# Patient Record
Sex: FEMALE | Race: WHITE | Employment: FULL TIME
[De-identification: names, ages, dates, MRNs, and addresses within clinical notes are randomized per-mention and may not be internally consistent; named-entity substitution may affect disease eponyms.]

---

## 2017-05-29 ENCOUNTER — TELEPHONE (OUTPATIENT)
Dept: FAMILY MEDICINE CLINIC | Facility: CLINIC | Age: 47
End: 2017-05-29

## 2017-05-29 DIAGNOSIS — F32.0 MAJOR DEPRESSIVE DISORDER, SINGLE EPISODE, MILD (HCC): Primary | Chronic | ICD-10-CM

## 2017-05-29 DIAGNOSIS — E78.2 MIXED HYPERLIPIDEMIA: ICD-10-CM

## 2017-05-30 PROBLEM — E78.2 MIXED HYPERLIPIDEMIA: Status: ACTIVE | Noted: 2017-05-30

## 2017-05-30 RX ORDER — FLUOXETINE HYDROCHLORIDE 40 MG/1
40 CAPSULE ORAL DAILY
Qty: 30 CAPSULE | Refills: 1 | Status: SHIPPED | OUTPATIENT
Start: 2017-05-30 | End: 2017-08-15 | Stop reason: SDUPTHER

## 2017-06-01 ENCOUNTER — TELEPHONE (OUTPATIENT)
Dept: FAMILY MEDICINE CLINIC | Age: 47
End: 2017-06-01

## 2017-06-30 ENCOUNTER — OFFICE VISIT (OUTPATIENT)
Dept: FAMILY MEDICINE CLINIC | Age: 47
End: 2017-06-30
Payer: COMMERCIAL

## 2017-06-30 VITALS
SYSTOLIC BLOOD PRESSURE: 118 MMHG | HEART RATE: 92 BPM | RESPIRATION RATE: 18 BRPM | WEIGHT: 208 LBS | BODY MASS INDEX: 42.01 KG/M2 | DIASTOLIC BLOOD PRESSURE: 82 MMHG | TEMPERATURE: 99.1 F

## 2017-06-30 DIAGNOSIS — E78.2 MIXED HYPERLIPIDEMIA: ICD-10-CM

## 2017-06-30 DIAGNOSIS — F32.0 MAJOR DEPRESSIVE DISORDER, SINGLE EPISODE, MILD (HCC): Primary | Chronic | ICD-10-CM

## 2017-06-30 DIAGNOSIS — Z80.3 FAMILY HISTORY OF BREAST CANCER IN MOTHER: ICD-10-CM

## 2017-06-30 DIAGNOSIS — Z12.39 SCREENING FOR BREAST CANCER: ICD-10-CM

## 2017-06-30 PROCEDURE — 99214 OFFICE O/P EST MOD 30 MIN: CPT | Performed by: NURSE PRACTITIONER

## 2017-06-30 ASSESSMENT — ENCOUNTER SYMPTOMS
SORE THROAT: 0
VOMITING: 0
COUGH: 0
ABDOMINAL PAIN: 0
BLOOD IN STOOL: 0
EYE DISCHARGE: 0
DIARRHEA: 0
SINUS PRESSURE: 0
BACK PAIN: 0
CHOKING: 0
WHEEZING: 0
NAUSEA: 0
EYE REDNESS: 0
SHORTNESS OF BREATH: 0
RHINORRHEA: 0
CONSTIPATION: 0

## 2017-06-30 ASSESSMENT — PATIENT HEALTH QUESTIONNAIRE - PHQ9
SUM OF ALL RESPONSES TO PHQ9 QUESTIONS 1 & 2: 0
SUM OF ALL RESPONSES TO PHQ QUESTIONS 1-9: 0
2. FEELING DOWN, DEPRESSED OR HOPELESS: 0
1. LITTLE INTEREST OR PLEASURE IN DOING THINGS: 0

## 2017-08-15 DIAGNOSIS — F32.0 MAJOR DEPRESSIVE DISORDER, SINGLE EPISODE, MILD (HCC): Chronic | ICD-10-CM

## 2017-08-15 RX ORDER — FLUOXETINE HYDROCHLORIDE 40 MG/1
40 CAPSULE ORAL DAILY
Qty: 30 CAPSULE | Refills: 1 | Status: SHIPPED | OUTPATIENT
Start: 2017-08-15 | End: 2017-11-20 | Stop reason: SDUPTHER

## 2017-11-13 LAB
ALBUMIN SERPL-MCNC: 4 G/DL
ALP BLD-CCNC: 88 U/L
ALT SERPL-CCNC: 18 U/L
ANION GAP SERPL CALCULATED.3IONS-SCNC: 12 MMOL/L
AST SERPL-CCNC: 18 U/L
BILIRUB SERPL-MCNC: 0.4 MG/DL (ref 0.1–1.4)
BUN BLDV-MCNC: 8 MG/DL
CALCIUM SERPL-MCNC: 9.5 MG/DL
CHLORIDE BLD-SCNC: 100 MMOL/L
CHOLESTEROL, TOTAL: 217 MG/DL
CHOLESTEROL/HDL RATIO: 4.8
CO2: 25 MMOL/L
CREAT SERPL-MCNC: 0.68 MG/DL
GFR CALCULATED: >60
GLUCOSE BLD-MCNC: 66 MG/DL
HDLC SERPL-MCNC: 45 MG/DL (ref 35–70)
LDL CHOLESTEROL CALCULATED: 133 MG/DL (ref 0–160)
POTASSIUM SERPL-SCNC: 4.4 MMOL/L
SODIUM BLD-SCNC: 137 MMOL/L
TOTAL PROTEIN: 7.4
TRIGL SERPL-MCNC: 194 MG/DL
VITAMIN D 25-HYDROXY: 20.9
VITAMIN D2, 25 HYDROXY: NORMAL
VITAMIN D3,25 HYDROXY: NORMAL
VLDLC SERPL CALC-MCNC: 39 MG/DL

## 2017-11-14 DIAGNOSIS — E78.2 MIXED HYPERLIPIDEMIA: ICD-10-CM

## 2017-11-14 DIAGNOSIS — E78.1 HYPERTRIGLYCERIDEMIA: ICD-10-CM

## 2017-11-15 RX ORDER — ACETAMINOPHEN 160 MG
1 TABLET,DISINTEGRATING ORAL DAILY
Qty: 30 CAPSULE | COMMUNITY
Start: 2017-11-15 | End: 2020-03-19

## 2017-11-20 DIAGNOSIS — F32.0 MAJOR DEPRESSIVE DISORDER, SINGLE EPISODE, MILD (HCC): Chronic | ICD-10-CM

## 2017-11-20 RX ORDER — FLUOXETINE HYDROCHLORIDE 40 MG/1
40 CAPSULE ORAL DAILY
Qty: 30 CAPSULE | Refills: 3 | Status: SHIPPED | OUTPATIENT
Start: 2017-11-20 | End: 2018-04-12 | Stop reason: SDUPTHER

## 2018-01-18 ENCOUNTER — OFFICE VISIT (OUTPATIENT)
Dept: FAMILY MEDICINE CLINIC | Age: 48
End: 2018-01-18
Payer: COMMERCIAL

## 2018-01-18 VITALS
HEIGHT: 60 IN | WEIGHT: 208 LBS | DIASTOLIC BLOOD PRESSURE: 80 MMHG | HEART RATE: 87 BPM | BODY MASS INDEX: 40.84 KG/M2 | TEMPERATURE: 96.8 F | RESPIRATION RATE: 20 BRPM | SYSTOLIC BLOOD PRESSURE: 117 MMHG

## 2018-01-18 DIAGNOSIS — J20.9 BRONCHITIS WITH BRONCHOSPASM: Primary | ICD-10-CM

## 2018-01-18 DIAGNOSIS — J01.40 ACUTE NON-RECURRENT PANSINUSITIS: ICD-10-CM

## 2018-01-18 PROCEDURE — 99213 OFFICE O/P EST LOW 20 MIN: CPT | Performed by: NURSE PRACTITIONER

## 2018-01-18 RX ORDER — ALBUTEROL SULFATE 90 UG/1
2 AEROSOL, METERED RESPIRATORY (INHALATION) EVERY 6 HOURS PRN
Qty: 1 INHALER | Refills: 0 | Status: SHIPPED | OUTPATIENT
Start: 2018-01-18

## 2018-01-18 RX ORDER — METHYLPREDNISOLONE 4 MG/1
TABLET ORAL
Qty: 1 KIT | Refills: 0 | Status: SHIPPED | OUTPATIENT
Start: 2018-01-18 | End: 2018-01-24

## 2018-01-18 RX ORDER — FLUTICASONE PROPIONATE 50 MCG
1 SPRAY, SUSPENSION (ML) NASAL DAILY
Qty: 1 BOTTLE | Refills: 0 | COMMUNITY
Start: 2018-01-18

## 2018-01-18 ASSESSMENT — ENCOUNTER SYMPTOMS
ABDOMINAL PAIN: 0
CHEST TIGHTNESS: 0
COUGH: 1
DIARRHEA: 0
NAUSEA: 0
RHINORRHEA: 1
CONSTIPATION: 0
SHORTNESS OF BREATH: 0
EYE DISCHARGE: 1
VOICE CHANGE: 1
WHEEZING: 1
SORE THROAT: 0
VOMITING: 1

## 2018-01-18 NOTE — PATIENT INSTRUCTIONS
good.  When should you call for help? Call 911 anytime you think you may need emergency care. For example, call if:  ? · You have severe trouble breathing. ?Call your doctor now or seek immediate medical care if:  ? · You have new or worse trouble breathing. ? · You cough up dark brown or bloody mucus (sputum). ? · You have a new or higher fever. ? · You have a new rash. ? Watch closely for changes in your health, and be sure to contact your doctor if:  ? · You cough more deeply or more often, especially if you notice more mucus or a change in the color of your mucus. ? · You are not getting better as expected. Where can you learn more? Go to https://Green Throttle Games.Game Nation. org and sign in to your [employer] account. Enter H333 in the [employer] box to learn more about \"Bronchitis: Care Instructions. \"     If you do not have an account, please click on the \"Sign Up Now\" link. Current as of: May 12, 2017  Content Version: 11.5  © 6162-1048 [employer]. Care instructions adapted under license by [facility] ([facility]). If you have questions about a medical condition or this instruction, always ask your healthcare professional. [place] [address] any warranty or liability for your use of this information. Patient Education        Sinusitis: Care Instructions  Your Care Instructions    Sinusitis is an infection of the lining of the sinus cavities in your head. Sinusitis often follows a cold. It causes pain and pressure in your head and face. In most cases, sinusitis gets better on its own in 1 to 2 weeks. But some mild symptoms may last for several weeks. Sometimes antibiotics are needed. Follow-up care is a key part of your treatment and safety. Be sure to make and go to all appointments, and call your doctor if you are having problems. It's also a good idea to know your test results and keep a list of the medicines you take.   How can you care for yourself at salt water into the syringe by relaxing your hand. · Sit down with your head tilted slightly back. Do not lie down. Put the tip of the bulb syringe or the squeeze bottle a little way into one of your nostrils. Gently drip or squirt a few drops into the nostril. Repeat with the other nostril. Some sneezing and gagging are normal at first.  · Gently blow your nose. · Wipe the syringe or bottle tip clean after each use. · Repeat this 2 or 3 times a day. · Use nasal washes gently if you have nosebleeds often. When should you call for help? Watch closely for changes in your health, and be sure to contact your doctor if:  ? · You often get nosebleeds. ? · You have problems doing the nasal washes. Where can you learn more? Go to https://chpelynneweb.Samfind. org and sign in to your Pop.it account. Enter [id number] [id number] [phone] in the [employer] box to learn more about \"Saline Nasal Washes: Care Instructions. \"     If you do not have an account, please click on the \"Sign Up Now\" link. Current as of: May 12, 2017  Content Version: 11.5  © 8397-2572 Healthwise, Incorporated. Care instructions adapted under license by [facility] ([facility]). If you have questions about a medical condition or this instruction, always ask your healthcare professional. [address]  any warranty or liability for your use of this information.

## 2018-01-18 NOTE — LETTER
[address] [place] [address]  Phone: [phone]  Fax: [address] [place]        January 18, 2018     Patient: Burgess Blakely   YOB: 1970   Date of Visit: 1/18/2018       To Whom it May Concern:    Burgess Blakely was seen in my office on 1/18/2018. She may return to work on 1/19/18. Please excuse her from work on 1/16/18-1/18/18. If you have any questions or concerns, please don't hesitate to call.     Sincerely,         Alissa Mariano, CNP

## 2018-01-18 NOTE — PROGRESS NOTES
Subjective:      Patient ID: Salina Nguyen is a 52 y.o. female. Visit Information    Have you changed or started any medications since your last visit including any over-the-counter medicines, vitamins, or herbal medicines? no   Have you stopped taking any of your medications? Is so, why? -  no  Are you having any side effects from any of your medications? - no    Have you seen any other physician or provider since your last visit?  no   Have you had any other diagnostic tests since your last visit?  no   Have you been seen in the emergency room and/or had an admission in a hospital since we last saw you?  no   Have you had your routine dental cleaning in the past 6 months?  no     Do you have an active MyChart account? If no, what is the barrier? Yes    Patient Care Team:  Valentino Mayers, MD as PCP - General (Pediatrics)    Medical History Review  Past Medical, Family, and Social History reviewed and does not contribute to the patient presenting condition    Health Maintenance   Topic Date Due    HIV screen  06/18/1985    Breast cancer screen  03/19/2014    Flu vaccine (1) 11/01/2018 (Originally 9/1/2017)    Cervical cancer screen  04/13/2018    Lipid screen  11/13/2022    DTaP/Tdap/Td vaccine (2 - Td) 04/10/2024       Cough / congestion for last 4 days. Getting worse. No fever. Has been hot and cold on and off. Cough   This is a new problem. The current episode started in the past 7 days. The problem has been gradually worsening. The problem occurs every few hours. The cough is productive of sputum (pale yellow). Associated symptoms include chest pain (with coughing), chills, ear congestion, headaches, nasal congestion, postnasal drip, rhinorrhea and wheezing. Pertinent negatives include no ear pain, fever, sore throat or shortness of breath. Nothing aggravates the symptoms. She has tried rest and body position changes (nyquil, tylenol sinus ) for the symptoms. The treatment provided mild relief. Her past medical history is significant for bronchitis, environmental allergies and pneumonia. There is no history of asthma. Review of Systems   Constitutional: Positive for appetite change, chills and fatigue. Negative for fever. HENT: Positive for congestion, postnasal drip, rhinorrhea and voice change. Negative for ear pain and sore throat. Eyes: Positive for discharge (watering). Respiratory: Positive for cough and wheezing. Negative for chest tightness and shortness of breath. Cardiovascular: Positive for chest pain (with coughing). Gastrointestinal: Positive for vomiting (couple of days ago - drainage). Negative for abdominal pain, constipation, diarrhea and nausea. Genitourinary: Negative. Allergic/Immunologic: Positive for environmental allergies. Neurological: Positive for headaches. Negative for dizziness and syncope. Psychiatric/Behavioral: Positive for sleep disturbance. Objective:   Physical Exam   Constitutional: She is oriented to person, place, and time. She appears well-developed. No distress. HENT:   Head: Normocephalic and atraumatic. Right Ear: Hearing, tympanic membrane, external ear and ear canal normal.   Left Ear: Hearing, tympanic membrane, external ear and ear canal normal.   Nose: Mucosal edema, rhinorrhea and sinus tenderness present. Right sinus exhibits maxillary sinus tenderness. Right sinus exhibits no frontal sinus tenderness. Left sinus exhibits maxillary sinus tenderness. Left sinus exhibits no frontal sinus tenderness. Mouth/Throat: Uvula is midline and mucous membranes are normal. Posterior oropharyngeal erythema (with PND) present. No oropharyngeal exudate. Eyes: Conjunctivae are normal. Pupils are equal, round, and reactive to light. Right eye exhibits no discharge. Left eye exhibits no discharge. Neck: Neck supple. Cardiovascular: Normal rate, regular rhythm and normal heart sounds.     Pulmonary/Chest: Effort normal. No respiratory distress. She has wheezes (expiratory t/o). She has rhonchi in the right upper field and the left upper field. Harsh almost constant cough in office    Abdominal: Soft. Bowel sounds are normal. She exhibits no distension. There is no tenderness. Lymphadenopathy:     She has no cervical adenopathy. Neurological: She is alert and oriented to person, place, and time. Skin: Skin is warm and dry. Psychiatric: She has a normal mood and affect. Her behavior is normal.   Nursing note and vitals reviewed. Assessment:      1. Bronchitis with bronchospasm  methylPREDNISolone (MEDROL, EDA,) 4 MG tablet    albuterol sulfate HFA (PROAIR HFA) 108 (90 Base) MCG/ACT inhaler   2. Acute non-recurrent pansinusitis  fluticasone (FLONASE) 50 MCG/ACT nasal spray         Plan:       Start flonase daily  Proceed with a start medrol dose eda   Recommend using albuterol inhaler as needed for wheezing  Recommend OTC Tylenol/Motrin for discomfort   Advise to use salt water gargles for sore throat  Advise to take hot water steam to help with congestion  Use saline rinse to nose as needed for congestion. Recommend use humidifier at night. May use hot tea with honey and lemon for sore throat/cough. Keep well hydrated and get well rested  Call if any concerns  Return if symptoms worsen or fail to improve. Heidy L received counseling on the following healthy behaviors: nutrition and medication adherence  Reviewed prior labs and health maintenance. Continue current medications, diet and exercise. Discussed use, benefit, and side effects of prescribed medications. Barriers to medication compliance addressed. Patient given educational materials - see patient instructions. All patient questions answered. Patient voiced understanding.

## 2018-05-14 ENCOUNTER — OFFICE VISIT (OUTPATIENT)
Dept: FAMILY MEDICINE CLINIC | Age: 48
End: 2018-05-14
Payer: COMMERCIAL

## 2018-05-14 VITALS
BODY MASS INDEX: 41.21 KG/M2 | RESPIRATION RATE: 18 BRPM | TEMPERATURE: 98.3 F | DIASTOLIC BLOOD PRESSURE: 82 MMHG | SYSTOLIC BLOOD PRESSURE: 128 MMHG | WEIGHT: 211 LBS | HEART RATE: 100 BPM

## 2018-05-14 DIAGNOSIS — B37.2 YEAST DERMATITIS: ICD-10-CM

## 2018-05-14 DIAGNOSIS — Z12.31 ENCOUNTER FOR SCREENING MAMMOGRAM FOR BREAST CANCER: ICD-10-CM

## 2018-05-14 DIAGNOSIS — Z01.419 WELL WOMAN EXAM WITH ROUTINE GYNECOLOGICAL EXAM: Primary | ICD-10-CM

## 2018-05-14 DIAGNOSIS — E78.2 MIXED HYPERLIPIDEMIA: ICD-10-CM

## 2018-05-14 DIAGNOSIS — Z00.00 PHYSICAL EXAM, ANNUAL: ICD-10-CM

## 2018-05-14 DIAGNOSIS — F32.0 MAJOR DEPRESSIVE DISORDER, SINGLE EPISODE, MILD (HCC): Chronic | ICD-10-CM

## 2018-05-14 PROCEDURE — 99396 PREV VISIT EST AGE 40-64: CPT | Performed by: NURSE PRACTITIONER

## 2018-05-14 RX ORDER — NYSTATIN 100000 [USP'U]/G
POWDER TOPICAL
Qty: 1 BOTTLE | Refills: 2 | Status: SHIPPED | OUTPATIENT
Start: 2018-05-14 | End: 2019-05-14

## 2018-05-14 RX ORDER — FLUOXETINE HYDROCHLORIDE 40 MG/1
40 CAPSULE ORAL DAILY
Qty: 30 CAPSULE | Refills: 5 | Status: SHIPPED | OUTPATIENT
Start: 2018-05-14 | End: 2018-12-27 | Stop reason: SDUPTHER

## 2018-05-14 ASSESSMENT — ENCOUNTER SYMPTOMS
SHORTNESS OF BREATH: 0
VOMITING: 0
RHINORRHEA: 0
WHEEZING: 0
ABDOMINAL PAIN: 1
EYE REDNESS: 0
EYE DISCHARGE: 0
BLOOD IN STOOL: 0
SINUS PRESSURE: 0
SORE THROAT: 0
COUGH: 0
DIARRHEA: 0
CHOKING: 0
CONSTIPATION: 0
NAUSEA: 0
BACK PAIN: 0

## 2018-05-21 DIAGNOSIS — Z01.419 WELL WOMAN EXAM WITH ROUTINE GYNECOLOGICAL EXAM: ICD-10-CM

## 2018-10-22 ENCOUNTER — APPOINTMENT (OUTPATIENT)
Dept: CT IMAGING | Age: 48
DRG: 069 | End: 2018-10-22
Payer: COMMERCIAL

## 2018-10-22 ENCOUNTER — HOSPITAL ENCOUNTER (INPATIENT)
Age: 48
LOS: 1 days | Discharge: HOME | DRG: 069 | End: 2018-10-23
Attending: EMERGENCY MEDICINE | Admitting: PSYCHIATRY & NEUROLOGY
Payer: COMMERCIAL

## 2018-10-22 ENCOUNTER — APPOINTMENT (OUTPATIENT)
Dept: MRI IMAGING | Age: 48
DRG: 069 | End: 2018-10-22
Payer: COMMERCIAL

## 2018-10-22 DIAGNOSIS — I63.9 CEREBROVASCULAR ACCIDENT (CVA), UNSPECIFIED MECHANISM (HCC): Primary | ICD-10-CM

## 2018-10-22 PROBLEM — G45.9 TIA (TRANSIENT ISCHEMIC ATTACK): Status: ACTIVE | Noted: 2018-10-22

## 2018-10-22 LAB — HCG QUALITATIVE: NEGATIVE

## 2018-10-22 PROCEDURE — 6360000004 HC RX CONTRAST MEDICATION: Performed by: EMERGENCY MEDICINE

## 2018-10-22 PROCEDURE — 70496 CT ANGIOGRAPHY HEAD: CPT

## 2018-10-22 PROCEDURE — 99285 EMERGENCY DEPT VISIT HI MDM: CPT

## 2018-10-22 PROCEDURE — 99254 IP/OBS CNSLTJ NEW/EST MOD 60: CPT | Performed by: PSYCHIATRY & NEUROLOGY

## 2018-10-22 PROCEDURE — 70551 MRI BRAIN STEM W/O DYE: CPT

## 2018-10-22 PROCEDURE — 70498 CT ANGIOGRAPHY NECK: CPT

## 2018-10-22 PROCEDURE — 84703 CHORIONIC GONADOTROPIN ASSAY: CPT

## 2018-10-22 PROCEDURE — 2060000000 HC ICU INTERMEDIATE R&B

## 2018-10-22 RX ADMIN — IOPAMIDOL 90 ML: 755 INJECTION, SOLUTION INTRAVENOUS at 22:54

## 2018-10-23 VITALS
BODY MASS INDEX: 40.32 KG/M2 | WEIGHT: 200 LBS | TEMPERATURE: 97.1 F | HEIGHT: 59 IN | DIASTOLIC BLOOD PRESSURE: 75 MMHG | HEART RATE: 85 BPM | OXYGEN SATURATION: 95 % | RESPIRATION RATE: 17 BRPM | SYSTOLIC BLOOD PRESSURE: 145 MMHG

## 2018-10-23 LAB
ANION GAP SERPL CALCULATED.3IONS-SCNC: 11 MMOL/L (ref 9–17)
BUN BLDV-MCNC: 8 MG/DL (ref 6–20)
BUN/CREAT BLD: NORMAL (ref 9–20)
CALCIUM SERPL-MCNC: 9.1 MG/DL (ref 8.6–10.4)
CHLORIDE BLD-SCNC: 100 MMOL/L (ref 98–107)
CHOLESTEROL/HDL RATIO: 5.1
CHOLESTEROL: 198 MG/DL
CO2: 28 MMOL/L (ref 20–31)
CREAT SERPL-MCNC: 0.62 MG/DL (ref 0.5–0.9)
ESTIMATED AVERAGE GLUCOSE: 117 MG/DL
GFR AFRICAN AMERICAN: >60 ML/MIN
GFR NON-AFRICAN AMERICAN: >60 ML/MIN
GFR SERPL CREATININE-BSD FRML MDRD: NORMAL ML/MIN/{1.73_M2}
GFR SERPL CREATININE-BSD FRML MDRD: NORMAL ML/MIN/{1.73_M2}
GLUCOSE BLD-MCNC: 76 MG/DL (ref 70–99)
HBA1C MFR BLD: 5.7 % (ref 4–6)
HCT VFR BLD CALC: 37.7 % (ref 36.3–47.1)
HDLC SERPL-MCNC: 39 MG/DL
HEMOGLOBIN: 11.9 G/DL (ref 11.9–15.1)
LDL CHOLESTEROL: 95 MG/DL (ref 0–130)
LV EF: 60 %
LVEF MODALITY: NORMAL
MCH RBC QN AUTO: 26.9 PG (ref 25.2–33.5)
MCHC RBC AUTO-ENTMCNC: 31.6 G/DL (ref 28.4–34.8)
MCV RBC AUTO: 85.1 FL (ref 82.6–102.9)
NRBC AUTOMATED: 0 PER 100 WBC
PDW BLD-RTO: 15.9 % (ref 11.8–14.4)
PLATELET # BLD: 320 K/UL (ref 138–453)
PMV BLD AUTO: 9.4 FL (ref 8.1–13.5)
POTASSIUM SERPL-SCNC: 4.1 MMOL/L (ref 3.7–5.3)
RBC # BLD: 4.43 M/UL (ref 3.95–5.11)
SODIUM BLD-SCNC: 139 MMOL/L (ref 135–144)
TRIGL SERPL-MCNC: 320 MG/DL
VLDLC SERPL CALC-MCNC: ABNORMAL MG/DL (ref 1–30)
WBC # BLD: 10.7 K/UL (ref 3.5–11.3)

## 2018-10-23 PROCEDURE — 6370000000 HC RX 637 (ALT 250 FOR IP): Performed by: STUDENT IN AN ORGANIZED HEALTH CARE EDUCATION/TRAINING PROGRAM

## 2018-10-23 PROCEDURE — 2580000003 HC RX 258: Performed by: STUDENT IN AN ORGANIZED HEALTH CARE EDUCATION/TRAINING PROGRAM

## 2018-10-23 PROCEDURE — 97165 OT EVAL LOW COMPLEX 30 MIN: CPT

## 2018-10-23 PROCEDURE — 85027 COMPLETE CBC AUTOMATED: CPT

## 2018-10-23 PROCEDURE — G8987 SELF CARE CURRENT STATUS: HCPCS

## 2018-10-23 PROCEDURE — 80061 LIPID PANEL: CPT

## 2018-10-23 PROCEDURE — 83036 HEMOGLOBIN GLYCOSYLATED A1C: CPT

## 2018-10-23 PROCEDURE — G8989 SELF CARE D/C STATUS: HCPCS

## 2018-10-23 PROCEDURE — 97535 SELF CARE MNGMENT TRAINING: CPT

## 2018-10-23 PROCEDURE — 99223 1ST HOSP IP/OBS HIGH 75: CPT | Performed by: PSYCHIATRY & NEUROLOGY

## 2018-10-23 PROCEDURE — G8988 SELF CARE GOAL STATUS: HCPCS

## 2018-10-23 PROCEDURE — 80048 BASIC METABOLIC PNL TOTAL CA: CPT

## 2018-10-23 PROCEDURE — 36415 COLL VENOUS BLD VENIPUNCTURE: CPT

## 2018-10-23 PROCEDURE — 93306 TTE W/DOPPLER COMPLETE: CPT

## 2018-10-23 RX ORDER — ASPIRIN 81 MG/1
81 TABLET ORAL DAILY
Status: DISCONTINUED | OUTPATIENT
Start: 2018-10-23 | End: 2018-10-23 | Stop reason: HOSPADM

## 2018-10-23 RX ORDER — SODIUM CHLORIDE 0.9 % (FLUSH) 0.9 %
10 SYRINGE (ML) INJECTION EVERY 12 HOURS SCHEDULED
Status: DISCONTINUED | OUTPATIENT
Start: 2018-10-23 | End: 2018-10-23 | Stop reason: HOSPADM

## 2018-10-23 RX ORDER — ASPIRIN 81 MG/1
81 TABLET ORAL DAILY
Qty: 30 TABLET | Refills: 3 | Status: SHIPPED | OUTPATIENT
Start: 2018-10-24 | End: 2019-03-19 | Stop reason: SDUPTHER

## 2018-10-23 RX ORDER — ATORVASTATIN CALCIUM 40 MG/1
40 TABLET, FILM COATED ORAL NIGHTLY
Status: DISCONTINUED | OUTPATIENT
Start: 2018-10-23 | End: 2018-10-23 | Stop reason: HOSPADM

## 2018-10-23 RX ORDER — ATORVASTATIN CALCIUM 40 MG/1
40 TABLET, FILM COATED ORAL NIGHTLY
Qty: 30 TABLET | Refills: 3 | Status: SHIPPED | OUTPATIENT
Start: 2018-10-23 | End: 2019-03-19 | Stop reason: SDUPTHER

## 2018-10-23 RX ORDER — ACETAMINOPHEN 325 MG/1
650 TABLET ORAL EVERY 4 HOURS PRN
Status: DISCONTINUED | OUTPATIENT
Start: 2018-10-23 | End: 2018-10-23 | Stop reason: HOSPADM

## 2018-10-23 RX ORDER — ATORVASTATIN CALCIUM 40 MG/1
40 TABLET, FILM COATED ORAL NIGHTLY
Qty: 30 TABLET | Refills: 3 | Status: CANCELLED | OUTPATIENT
Start: 2018-10-23

## 2018-10-23 RX ORDER — SODIUM CHLORIDE 0.9 % (FLUSH) 0.9 %
10 SYRINGE (ML) INJECTION PRN
Status: DISCONTINUED | OUTPATIENT
Start: 2018-10-23 | End: 2018-10-23 | Stop reason: HOSPADM

## 2018-10-23 RX ORDER — ONDANSETRON 2 MG/ML
4 INJECTION INTRAMUSCULAR; INTRAVENOUS EVERY 6 HOURS PRN
Status: DISCONTINUED | OUTPATIENT
Start: 2018-10-23 | End: 2018-10-23 | Stop reason: HOSPADM

## 2018-10-23 RX ADMIN — ASPIRIN 81 MG: 81 TABLET ORAL at 08:40

## 2018-10-23 RX ADMIN — ACETAMINOPHEN 650 MG: 325 TABLET ORAL at 13:26

## 2018-10-23 RX ADMIN — SODIUM CHLORIDE, PRESERVATIVE FREE 10 ML: 5 INJECTION INTRAVENOUS at 08:41

## 2018-10-23 ASSESSMENT — PAIN SCALES - GENERAL
PAINLEVEL_OUTOF10: 0
PAINLEVEL_OUTOF10: 0
PAINLEVEL_OUTOF10: 4

## 2018-10-23 NOTE — FLOWSHEET NOTE
[facility]     Emergency/Trauma Note    PATIENT NAME: Minerva Harrington    Shift date: 10/22/18  Shift day: Monday   Shift # 2    Room # 18/18   Name: Minerva Harrington            Age: 50 y.o. Gender: female          Latter day: None   Place of Lutheran: None    Trauma/Incident type: Stroke Consult  Admit Date & Time: 10/22/2018  9:11 PM    PATIENT/EVENT DESCRIPTION:  Minerva Harrington is a 50 y.o. female who arrived via [facility] ambulance from [facility] where she reports that she went from work today after complaining of left-sided numbness to both her leg & arm. Per pt, [place] is near her place of work, so she went there on her own. Pt alert/ oriented when seen by , but still feeling the symptoms as before. Pt to be admitted to 18/18. SPIRITUAL ASSESSMENT/INTERVENTION:  Pt was open & accepting of 's presence in ED 25. Pt's  Vanessa Taylor & pt's mother entered room while / pt were engaged in conversation. Pt told  that she has no Gnosticist or Congregational connections. She declined his offer of prayer @ this time. PATIENT BELONGINGS:  No belongings noted    ANY BELONGINGS OF SIGNIFICANT VALUE NOTED:  None    REGISTRATION STAFF NOTIFIED? Yes    WHAT IS YOUR SPIRITUAL CARE PLAN FOR THIS PATIENT?:  Chaplains will remain available to offer spiritual and emotional support as needed. Electronically signed by Rev. Karl Barlow, [facility], [facility]23/2018 [facility]  [phone]     10/22/18 3006   Encounter Summary   Services provided to: Patient and family together  ( Vanessa Taylor & pt's mom in room)   Referral/Consult From: Multi-disciplinary team  (Stroke Team)   Support System Spouse; Children;Parent   Place of Advent none   Continue Visiting (10/22)   Complexity of Encounter Moderate   Length of Encounter 15 minutes   Spiritual Assessment Completed Yes   Crisis   Type Stroke

## 2018-10-23 NOTE — PLAN OF CARE
Problem: HEMODYNAMIC STATUS  Goal: Patient has stable vital signs and fluid balance  Outcome: Met This Shift  Neuro assessment completed, fall precautions in place, aspirations precautions in place, assess for barriers in communication and mobility, interventions to assist in communication and mobility in place, encouraged to call for assistance, adaptive devices used as needed, assess emotional state and support offered, encouraged patient to communicate by available means, and support systems included in patient care.

## 2018-10-23 NOTE — ED NOTES
New set of vitals taken, pt sitting up in bed eating, family at bed side, will continue to monitor pt.      Hermelinda Zelaya RN  10/23/18 2658

## 2018-10-23 NOTE — H&P
icterus, redness, pupils equal and reactive, extraocular eye movements intact, conjunctiva clear  Ear: normal external ear, no discharge, hearing intact  Nose:  no drainage noted  Mouth: mucous membranes moist   NECK  LUNGS  Supple and no bruits heard  Clear to auscultation,b/l   Cardiovascular  Abdomen  S1, S2 heard; radial pulse intact,RRR  Soft,non-tender,no organomegaly,BS+   MENTAL STATUS:  Alert, oriented, intact memory, no confusion, normal speech, normal language, no hallucination or delusion   CRANIAL NERVES: II     -       Pupils reactive b/l visual acuity: 20/30 OU; Fundus exam: intact venous pulsations; Visual fields intact to confrontation  III,IV,VI -  EOMs full, no afferent defect, no                      KACEY, no ptosis  V     -     Normal facial sensation  VII    -     Normal facial symmetry  VIII   -     Intact hearing  IX,X -     Symmetrical palate  XI    -     Symmetrical shoulder shrug  XII   -     Midline tongue, no atrophy    MOTOR FUNCTION:  Bulk R side:Normal            L side:Normal  Tone  R side:Normal            L side:Normal   Power 5/5 in upper and lower extremties;      no involuntary movements, no tremor     SENSORY FUNCTION:                      Extremities: Decrease sensation in L arm and leg    Peripheral pulses palpable, no pedal edema or calf pain with palpation   CEREBELLAR FUNCTION:  Heel to Shin:     Right side:  normal                             Left side:  normal    Finger to Nose:   Right:  normal                              Left:  normal            REFLEX FUNCTION:  Symmetric, no perverted reflex,      Right Bicep:  2+  Left Bicep:  2+  Right Knee:  2+  Left Knee:  2+    Babinski sign   Right side:Downgoing                          Left side   :Downgoing   STATION and GAIT  Not tested     INITIAL NIH STROKE SCALE     Time Performed:  9:50 PM     Administer stroke scale items in the order listed.  Record performance in each category after each subscale exam. Do not go back

## 2018-10-23 NOTE — ED PROVIDER NOTES
appropriate to the circumstances; and whenever words are used in this note in the singular or plural form, they shall be construed as though they were used in the form appropriate to the circumstances.)    MD Mar Zaldivar  Attending Emergency Medicine Physician            [place] Baeza MD  10/22/18 2125

## 2018-10-25 ENCOUNTER — TELEPHONE (OUTPATIENT)
Dept: FAMILY MEDICINE CLINIC | Age: 48
End: 2018-10-25

## 2018-12-27 DIAGNOSIS — F32.0 MAJOR DEPRESSIVE DISORDER, SINGLE EPISODE, MILD (HCC): Chronic | ICD-10-CM

## 2018-12-27 RX ORDER — FLUOXETINE HYDROCHLORIDE 40 MG/1
40 CAPSULE ORAL DAILY
Qty: 30 CAPSULE | Refills: 5 | Status: SHIPPED | OUTPATIENT
Start: 2018-12-27 | End: 2019-03-19 | Stop reason: DRUGHIGH

## 2018-12-27 NOTE — TELEPHONE ENCOUNTER
[facility] 5-14-18 # 30  5  LOV 5-14-18  RTO 2-8-19    Health Maintenance   Topic Date Due    HIV screen  06/18/1985    Flu vaccine (1) 09/01/2018    Breast cancer screen  05/25/2019    A1C test (Diabetic or Prediabetic)  10/23/2019    Cervical cancer screen  05/21/2023    Lipid screen  10/23/2023    DTaP/Tdap/Td vaccine (2 - Td) 04/10/2024             (applicable per patient's age: Cancer Screenings, Depression Screening, Fall Risk Screening, Immunizations)    Hemoglobin A1C (%)   Date Value   10/23/2018 5.7     LDL Cholesterol (mg/dL)   Date Value   10/23/2018 95     LDL Calculated (mg/dL)   Date Value   11/13/2017 133     AST (U/L)   Date Value   11/13/2017 18     ALT (U/L)   Date Value   11/13/2017 18     BUN (mg/dL)   Date Value   10/23/2018 8      (goal A1C is < 7)   (goal LDL is <100) need 30-50% reduction from baseline     BP Readings from Last 3 Encounters:   10/23/18 (!) 145/75   05/14/18 128/82   01/18/18 117/80    (goal /80)      All Future Testing planned in [facility]:  Lab Frequency Next Occurrence   DAKOTAH Digital Screen Bilateral [[id number]] Once 06/13/2018   Lipid Panel Once 11/15/2018   Comprehensive Metabolic Panel Once 86/74/3984   CBC Once 11/15/2018       Next Visit Date:  No future appointments.          Patient Active Problem List:     Major depressive disorder, single episode, mild (HCC)     Vitamin D deficiency     Mixed hyperlipidemia     TIA (transient ischemic attack)     Cerebrovascular accident (CVA) ([facility] [address]

## 2019-03-19 ENCOUNTER — OFFICE VISIT (OUTPATIENT)
Dept: FAMILY MEDICINE CLINIC | Age: 49
End: 2019-03-19
Payer: COMMERCIAL

## 2019-03-19 VITALS
BODY MASS INDEX: 43.22 KG/M2 | DIASTOLIC BLOOD PRESSURE: 72 MMHG | WEIGHT: 214 LBS | TEMPERATURE: 98 F | HEART RATE: 84 BPM | SYSTOLIC BLOOD PRESSURE: 108 MMHG | RESPIRATION RATE: 16 BRPM

## 2019-03-19 DIAGNOSIS — E78.2 MIXED HYPERLIPIDEMIA: Primary | ICD-10-CM

## 2019-03-19 DIAGNOSIS — N94.6 MENSES PAINFUL: ICD-10-CM

## 2019-03-19 DIAGNOSIS — F32.0 MAJOR DEPRESSIVE DISORDER, SINGLE EPISODE, MILD (HCC): ICD-10-CM

## 2019-03-19 DIAGNOSIS — E78.1 HYPERTRIGLYCERIDEMIA: ICD-10-CM

## 2019-03-19 DIAGNOSIS — N92.6 IRREGULAR MENSES: ICD-10-CM

## 2019-03-19 DIAGNOSIS — N92.1 MENORRHAGIA WITH IRREGULAR CYCLE: ICD-10-CM

## 2019-03-19 PROCEDURE — 99214 OFFICE O/P EST MOD 30 MIN: CPT | Performed by: NURSE PRACTITIONER

## 2019-03-19 RX ORDER — ASPIRIN 81 MG/1
81 TABLET ORAL DAILY
Qty: 30 TABLET | Refills: 5 | Status: SHIPPED | OUTPATIENT
Start: 2019-03-19 | End: 2019-10-14 | Stop reason: SDUPTHER

## 2019-03-19 RX ORDER — ATORVASTATIN CALCIUM 40 MG/1
40 TABLET, FILM COATED ORAL NIGHTLY
Qty: 30 TABLET | Refills: 5 | Status: SHIPPED | OUTPATIENT
Start: 2019-03-19 | End: 2019-10-27 | Stop reason: SDUPTHER

## 2019-03-19 RX ORDER — FLUOXETINE 20 MG/1
20 TABLET, FILM COATED ORAL DAILY
Qty: 30 TABLET | Refills: 3 | Status: SHIPPED | OUTPATIENT
Start: 2019-03-19 | End: 2020-03-19

## 2019-03-19 RX ORDER — IBUPROFEN 600 MG/1
600 TABLET ORAL EVERY 6 HOURS PRN
Qty: 120 TABLET | Refills: 1 | Status: SHIPPED | OUTPATIENT
Start: 2019-03-19 | End: 2020-03-19

## 2019-03-19 ASSESSMENT — ENCOUNTER SYMPTOMS
VOMITING: 0
ABDOMINAL PAIN: 0
EYE REDNESS: 0
WHEEZING: 0
EYE DISCHARGE: 0
SHORTNESS OF BREATH: 0
RHINORRHEA: 0
ABDOMINAL DISTENTION: 0
COUGH: 0
EYE PAIN: 0
SINUS PRESSURE: 0
BLOOD IN STOOL: 0
SORE THROAT: 0
CHEST TIGHTNESS: 0
BACK PAIN: 0
NAUSEA: 0

## 2019-03-20 LAB
ALBUMIN SERPL-MCNC: 3.9 G/DL
ALP BLD-CCNC: 102 U/L
ALT SERPL-CCNC: 14 U/L
ANION GAP SERPL CALCULATED.3IONS-SCNC: 6 MMOL/L
AST SERPL-CCNC: 13 U/L
BASOPHILS ABSOLUTE: ABNORMAL /ΜL
BASOPHILS RELATIVE PERCENT: ABNORMAL %
BILIRUB SERPL-MCNC: 0.3 MG/DL (ref 0.1–1.4)
BUN BLDV-MCNC: 11 MG/DL
CALCIUM SERPL-MCNC: 9.1 MG/DL
CHLORIDE BLD-SCNC: 105 MMOL/L
CHOLESTEROL, TOTAL: 169 MG/DL
CHOLESTEROL/HDL RATIO: 3.8
CO2: 29 MMOL/L
CREAT SERPL-MCNC: 0.65 MG/DL
EOSINOPHILS ABSOLUTE: ABNORMAL /ΜL
EOSINOPHILS RELATIVE PERCENT: ABNORMAL %
FERRITIN: 7 NG/ML (ref 9–150)
GFR CALCULATED: >60
GLUCOSE BLD-MCNC: 89 MG/DL
HCT VFR BLD CALC: 33.8 % (ref 36–46)
HDLC SERPL-MCNC: 44 MG/DL (ref 35–70)
HEMOGLOBIN: 11 G/DL (ref 12–16)
IRON: 24
LDL CHOLESTEROL CALCULATED: 91 MG/DL (ref 0–160)
LYMPHOCYTES ABSOLUTE: ABNORMAL /ΜL
LYMPHOCYTES RELATIVE PERCENT: ABNORMAL %
MCH RBC QN AUTO: 26.5 PG
MCHC RBC AUTO-ENTMCNC: 32.4 G/DL
MCV RBC AUTO: 82 FL
MONOCYTES ABSOLUTE: ABNORMAL /ΜL
MONOCYTES RELATIVE PERCENT: ABNORMAL %
NEUTROPHILS ABSOLUTE: ABNORMAL /ΜL
NEUTROPHILS RELATIVE PERCENT: ABNORMAL %
PLATELET # BLD: 382 K/ΜL
PMV BLD AUTO: 7.8 FL
POTASSIUM SERPL-SCNC: 4.2 MMOL/L
RBC # BLD: 4.14 10^6/ΜL
SODIUM BLD-SCNC: 140 MMOL/L
TOTAL IRON BINDING CAPACITY: 424
TOTAL PROTEIN: 7.2
TRIGL SERPL-MCNC: 172 MG/DL
VLDLC SERPL CALC-MCNC: 91 MG/DL
WBC # BLD: 7.6 10^3/ML

## 2019-03-22 DIAGNOSIS — E78.2 MIXED HYPERLIPIDEMIA: ICD-10-CM

## 2019-03-22 DIAGNOSIS — D64.9 LOW HEMOGLOBIN: Primary | ICD-10-CM

## 2019-03-22 DIAGNOSIS — E78.1 HYPERTRIGLYCERIDEMIA: ICD-10-CM

## 2019-10-27 ENCOUNTER — TELEPHONE (OUTPATIENT)
Dept: FAMILY MEDICINE CLINIC | Age: 49
End: 2019-10-27

## 2019-10-27 DIAGNOSIS — E78.2 MIXED HYPERLIPIDEMIA: ICD-10-CM

## 2019-10-27 DIAGNOSIS — D64.9 LOW HEMOGLOBIN: Primary | ICD-10-CM

## 2019-10-29 RX ORDER — ATORVASTATIN CALCIUM 40 MG/1
40 TABLET, FILM COATED ORAL NIGHTLY
Qty: 30 TABLET | Refills: 5 | Status: SHIPPED | OUTPATIENT
Start: 2019-10-29 | End: 2020-10-29

## 2019-11-01 DIAGNOSIS — D64.9 LOW HEMOGLOBIN: ICD-10-CM

## 2020-10-02 ENCOUNTER — TELEPHONE (OUTPATIENT)
Dept: FAMILY MEDICINE CLINIC | Age: 50
End: 2020-10-02

## 2024-06-11 ENCOUNTER — TELEPHONE (OUTPATIENT)
Dept: GYNECOLOGIC ONCOLOGY | Age: 54
End: 2024-06-11

## 2024-08-01 ENCOUNTER — TELEPHONE (OUTPATIENT)
Dept: GYNECOLOGIC ONCOLOGY | Age: 54
End: 2024-08-01

## 2024-08-28 ENCOUNTER — TELEPHONE (OUTPATIENT)
Dept: GYNECOLOGIC ONCOLOGY | Age: 54
End: 2024-08-28

## 2024-09-12 ENCOUNTER — TELEPHONE (OUTPATIENT)
Dept: GYNECOLOGIC ONCOLOGY | Age: 54
End: 2024-09-12

## 2024-09-20 ENCOUNTER — HOSPITAL ENCOUNTER (OUTPATIENT)
Age: 54
Setting detail: SPECIMEN
Discharge: HOME | End: 2024-09-20

## 2024-09-20 ENCOUNTER — OFFICE VISIT (OUTPATIENT)
Dept: GYNECOLOGIC ONCOLOGY | Age: 54
End: 2024-09-20
Payer: COMMERCIAL

## 2024-09-20 VITALS
TEMPERATURE: 97.7 F | HEIGHT: 59 IN | WEIGHT: 210.4 LBS | DIASTOLIC BLOOD PRESSURE: 76 MMHG | OXYGEN SATURATION: 96 % | SYSTOLIC BLOOD PRESSURE: 116 MMHG | HEART RATE: 89 BPM | BODY MASS INDEX: 42.42 KG/M2

## 2024-09-20 DIAGNOSIS — Z80.9 FAMILY HISTORY OF CANCER: ICD-10-CM

## 2024-09-20 DIAGNOSIS — Z15.89 BRIP1 GENE MUTATION POSITIVE: Primary | ICD-10-CM

## 2024-09-20 DIAGNOSIS — N89.8 VAGINAL DISCHARGE: ICD-10-CM

## 2024-09-20 DIAGNOSIS — Z80.0 FAMILY HISTORY OF PANCREATIC CANCER: ICD-10-CM

## 2024-09-20 PROBLEM — Z98.891 HISTORY OF CESAREAN SECTION: Status: ACTIVE | Noted: 2024-09-20

## 2024-09-20 PROBLEM — Z84.81 FAMILY HISTORY OF BRCA1 GENE POSITIVE: Status: ACTIVE | Noted: 2024-09-20

## 2024-09-20 PROBLEM — Z80.3 FAMILY HISTORY OF BREAST CANCER: Status: ACTIVE | Noted: 2024-09-20

## 2024-09-20 PROBLEM — J45.909 REACTIVE AIRWAY DISEASE WITHOUT COMPLICATION: Status: ACTIVE | Noted: 2024-09-20

## 2024-09-20 PROBLEM — I10 PRIMARY HYPERTENSION: Status: ACTIVE | Noted: 2024-09-20

## 2024-09-20 PROBLEM — Z90.710 HISTORY OF HYSTERECTOMY: Status: ACTIVE | Noted: 2024-09-20

## 2024-09-20 LAB
C TRACH DNA SPEC QL PROBE+SIG AMP: NORMAL
CANDIDA SPECIES: NEGATIVE
GARDNERELLA VAGINALIS: NEGATIVE
N GONORRHOEA DNA SPEC QL PROBE+SIG AMP: NORMAL
SOURCE: NORMAL
SPECIMEN DESCRIPTION: NORMAL
TRICHOMONAS: NEGATIVE

## 2024-09-20 PROCEDURE — 3074F SYST BP LT 130 MM HG: CPT | Performed by: OBSTETRICS & GYNECOLOGY

## 2024-09-20 PROCEDURE — 99417 PROLNG OP E/M EACH 15 MIN: CPT | Performed by: OBSTETRICS & GYNECOLOGY

## 2024-09-20 PROCEDURE — 3078F DIAST BP <80 MM HG: CPT | Performed by: OBSTETRICS & GYNECOLOGY

## 2024-09-20 PROCEDURE — 99205 OFFICE O/P NEW HI 60 MIN: CPT | Performed by: OBSTETRICS & GYNECOLOGY

## 2024-09-20 RX ORDER — CETIRIZINE HYDROCHLORIDE 5 MG/1
5 TABLET, CHEWABLE ORAL DAILY
COMMUNITY

## 2024-09-20 RX ORDER — AMOXICILLIN 500 MG
1 CAPSULE ORAL DAILY
COMMUNITY

## 2024-09-20 RX ORDER — MAGNESIUM L-LACTATE 84 MG
84 TABLET, EXTENDED RELEASE ORAL DAILY
COMMUNITY

## 2024-09-20 RX ORDER — NALTREXONE HYDROCHLORIDE 50 MG/1
12.5 TABLET, FILM COATED ORAL DAILY
COMMUNITY
Start: 2024-09-09

## 2024-09-20 RX ORDER — LOSARTAN POTASSIUM 50 MG/1
50 TABLET ORAL DAILY
COMMUNITY

## 2024-09-20 RX ORDER — FERROUS SULFATE 325(65) MG
1 TABLET ORAL DAILY
COMMUNITY
Start: 2024-08-20

## 2024-09-20 RX ORDER — BUPROPION HYDROCHLORIDE 150 MG/1
300 TABLET ORAL DAILY
COMMUNITY
Start: 2020-09-11

## 2024-09-20 RX ORDER — ZINC GLUCONATE 50 MG
50 TABLET ORAL DAILY
COMMUNITY

## 2024-09-23 LAB
C TRACH DNA SPEC QL PROBE+SIG AMP: NEGATIVE
N GONORRHOEA DNA SPEC QL PROBE+SIG AMP: NEGATIVE
SPECIMEN DESCRIPTION: NORMAL

## 2024-10-05 ENCOUNTER — HOSPITAL ENCOUNTER (OUTPATIENT)
Dept: MRI IMAGING | Age: 54
Discharge: HOME | End: 2024-10-07
Attending: OBSTETRICS & GYNECOLOGY
Payer: COMMERCIAL

## 2024-10-05 DIAGNOSIS — Z15.89 BRIP1 GENE MUTATION POSITIVE: ICD-10-CM

## 2024-10-05 DIAGNOSIS — Z80.0 FAMILY HISTORY OF PANCREATIC CANCER: ICD-10-CM

## 2024-10-05 LAB
CREAT SERPL-MCNC: 0.7 MG/DL (ref 0.5–0.9)
GFR, ESTIMATED: >90 ML/MIN/1.73M2

## 2024-10-05 PROCEDURE — 74183 MRI ABD W/O CNTR FLWD CNTR: CPT

## 2024-10-05 PROCEDURE — 6360000004 HC RX CONTRAST MEDICATION: Performed by: OBSTETRICS & GYNECOLOGY

## 2024-10-05 PROCEDURE — 2580000003 HC RX 258: Performed by: OBSTETRICS & GYNECOLOGY

## 2024-10-05 PROCEDURE — 82565 ASSAY OF CREATININE: CPT

## 2024-10-05 PROCEDURE — A9579 GAD-BASE MR CONTRAST NOS,1ML: HCPCS | Performed by: OBSTETRICS & GYNECOLOGY

## 2024-10-05 RX ORDER — 0.9 % SODIUM CHLORIDE 0.9 %
80 INTRAVENOUS SOLUTION INTRAVENOUS ONCE
Status: COMPLETED | OUTPATIENT
Start: 2024-10-05 | End: 2024-10-05

## 2024-10-05 RX ORDER — SODIUM CHLORIDE 0.9 % (FLUSH) 0.9 %
10 SYRINGE (ML) INJECTION PRN
Status: DISCONTINUED | OUTPATIENT
Start: 2024-10-05 | End: 2024-10-08 | Stop reason: HOSPADM

## 2024-10-05 RX ADMIN — SODIUM CHLORIDE, PRESERVATIVE FREE 10 ML: 5 INJECTION INTRAVENOUS at 10:37

## 2024-10-05 RX ADMIN — GADOTERIDOL 20 ML: 279.3 INJECTION, SOLUTION INTRAVENOUS at 10:35

## 2024-10-05 RX ADMIN — SODIUM CHLORIDE 80 ML: 9 INJECTION, SOLUTION INTRAVENOUS at 10:36

## 2024-10-28 ENCOUNTER — HOSPITAL ENCOUNTER (OUTPATIENT)
Dept: MAMMOGRAPHY | Age: 54
Discharge: HOME | End: 2024-10-30
Attending: OBSTETRICS & GYNECOLOGY
Payer: COMMERCIAL

## 2024-10-28 ENCOUNTER — HOSPITAL ENCOUNTER (OUTPATIENT)
Dept: MRI IMAGING | Age: 54
Discharge: HOME | End: 2024-10-30
Attending: OBSTETRICS & GYNECOLOGY
Payer: COMMERCIAL

## 2024-10-28 VITALS — BODY MASS INDEX: 42.33 KG/M2 | WEIGHT: 210 LBS | HEIGHT: 59 IN

## 2024-10-28 DIAGNOSIS — Z80.9 FAMILY HISTORY OF CANCER: ICD-10-CM

## 2024-10-28 DIAGNOSIS — Z15.89 BRIP1 GENE MUTATION POSITIVE: ICD-10-CM

## 2024-10-28 PROCEDURE — C8908 MRI W/O FOL W/CONT, BREAST,: HCPCS

## 2024-10-28 PROCEDURE — 2580000003 HC RX 258: Performed by: OBSTETRICS & GYNECOLOGY

## 2024-10-28 PROCEDURE — 77063 BREAST TOMOSYNTHESIS BI: CPT

## 2024-10-28 PROCEDURE — 6360000004 HC RX CONTRAST MEDICATION: Performed by: OBSTETRICS & GYNECOLOGY

## 2024-10-28 PROCEDURE — A9579 GAD-BASE MR CONTRAST NOS,1ML: HCPCS | Performed by: OBSTETRICS & GYNECOLOGY

## 2024-10-28 RX ORDER — 0.9 % SODIUM CHLORIDE 0.9 %
50 INTRAVENOUS SOLUTION INTRAVENOUS ONCE
Status: COMPLETED | OUTPATIENT
Start: 2024-10-28 | End: 2024-10-28

## 2024-10-28 RX ORDER — SODIUM CHLORIDE 0.9 % (FLUSH) 0.9 %
10 SYRINGE (ML) INJECTION PRN
Status: DISCONTINUED | OUTPATIENT
Start: 2024-10-28 | End: 2024-10-31 | Stop reason: HOSPADM

## 2024-10-28 RX ADMIN — GADOTERIDOL 20 ML: 279.3 INJECTION, SOLUTION INTRAVENOUS at 15:02

## 2024-10-28 RX ADMIN — SODIUM CHLORIDE 50 ML: 9 INJECTION, SOLUTION INTRAVENOUS at 15:01

## 2024-10-29 NOTE — RESULT ENCOUNTER NOTE
Called to inform patient of breast MRI results.  No answer, left detailed voicemail with results / instructions.

## 2024-11-06 ENCOUNTER — OFFICE VISIT (OUTPATIENT)
Dept: SURGERY | Age: 54
End: 2024-11-06

## 2024-11-06 VITALS
BODY MASS INDEX: 42.74 KG/M2 | WEIGHT: 212 LBS | HEART RATE: 80 BPM | OXYGEN SATURATION: 96 % | DIASTOLIC BLOOD PRESSURE: 90 MMHG | SYSTOLIC BLOOD PRESSURE: 135 MMHG | HEIGHT: 59 IN

## 2024-11-06 DIAGNOSIS — Z15.89 BRIP1 GENE MUTATION POSITIVE: Primary | ICD-10-CM

## 2024-11-06 NOTE — PROGRESS NOTES
Patient's Name/Date of Birth: Heidy Farnsworth / 1970 (54 y.o.)    Date: 2024    HPI: Pt is a 54 y.o. female who presents to [facility] for evaluation of high risk followup for BRIP1 mutation.  She has had a hysterectomy and her ovaries removed already.    GYN HISTORY:  Menarche: 12  Pregnancy History: G3 , P2 , First Child at 26 years old  Menopause: hysterectomy    Family history:  Breast cancer in paternal cousin twice same age diagnosed in her 30's  Father pancreatic cnacer  Paternal grandmother pancreatic cancer  Tested about 2 years ago.      Past Medical History:   Diagnosis Date    Allergic rhinitis     BRCA1 negative     BRCA2 negative     Depression     Hyperlipidemia     Obesity     Other malaise and fatigue        Past Surgical History:   Procedure Laterality Date     SECTION  ,     HYSTERECTOMY, TOTAL ABDOMINAL (CERVIX REMOVED)  2023    BSO    TUBAL LIGATION         Current Outpatient Medications   Medication Sig Dispense Refill    buPROPion (WELLBUTRIN XL) 150 MG extended release tablet Take 2 tablets by mouth daily      FEROSUL 325 (65 Fe) MG tablet Take 1 tablet by mouth daily      cetirizine (ZYRTEC) 5 MG chewable tablet Take 1 tablet by mouth daily      losartan (COZAAR) 50 MG tablet Take 1 tablet by mouth daily      magnesium lactate (MAG-TAB CR) 84 MG (7MEQ) TBCR extended release tablet Take 1 tablet by mouth daily      naltrexone (DEPADE) 50 MG tablet Take 0.25 tablets by mouth daily      zinc gluconate 50 MG tablet Take 1 tablet by mouth daily      Omega-3 Fatty Acids (FISH OIL) 1200 MG CAPS Take 1,200 mg by mouth daily      atorvastatin (LIPITOR) 40 MG tablet Take 1 tablet by mouth nightly 30 tablet 5    aspirin (EQ ASPIRIN ADULT LOW DOSE) 81 MG EC tablet Take 1 tablet by mouth daily 30 tablet 5    FLUoxetine (PROZAC) 20 MG tablet Take 1 tablet by mouth daily 30 tablet 3    ibuprofen (ADVIL;MOTRIN) 600 MG tablet Take 1 tablet by

## 2024-12-04 ENCOUNTER — OFFICE VISIT (OUTPATIENT)
Dept: SURGERY | Age: 54
End: 2024-12-04
Payer: COMMERCIAL

## 2024-12-04 VITALS
HEIGHT: 59 IN | HEART RATE: 88 BPM | WEIGHT: 213 LBS | SYSTOLIC BLOOD PRESSURE: 141 MMHG | DIASTOLIC BLOOD PRESSURE: 91 MMHG | BODY MASS INDEX: 42.94 KG/M2

## 2024-12-04 DIAGNOSIS — Z15.89 BRIP1 GENE MUTATION POSITIVE: Primary | ICD-10-CM

## 2024-12-04 PROCEDURE — 3077F SYST BP >= 140 MM HG: CPT | Performed by: PLASTIC SURGERY

## 2024-12-04 PROCEDURE — 3080F DIAST BP >= 90 MM HG: CPT | Performed by: PLASTIC SURGERY

## 2024-12-04 PROCEDURE — 99203 OFFICE O/P NEW LOW 30 MIN: CPT | Performed by: PLASTIC SURGERY

## 2024-12-04 NOTE — PROGRESS NOTES
[facility], [facility]  [address] [address]       History and Physical     Chief Complaint   Patient presents with    New Patient     Discuss breast reconstructive surgery due to high risk for breast cancer        HPI:   Heidy Farnsworth is a 54 y.o. female who presents with TONA P1 mutation..  Patient has an extensive history of breast cancer and pancreatic cancer.  Patient is status post hysterectomy.  Patient is considering bilateral mastectomies.  Patient is here to discuss her reconstructive options.    Medications:     Current Outpatient Medications   Medication Sig Dispense Refill    buPROPion (WELLBUTRIN XL) 150 MG extended release tablet Take 2 tablets by mouth daily      FEROSUL 325 (65 Fe) MG tablet Take 1 tablet by mouth daily      cetirizine (ZYRTEC) 5 MG chewable tablet Take 1 tablet by mouth daily      losartan (COZAAR) 50 MG tablet Take 1 tablet by mouth daily      magnesium lactate (MAG-TAB CR) 84 MG (7MEQ) TBCR extended release tablet Take 1 tablet by mouth daily      naltrexone (DEPADE) 50 MG tablet Take 0.25 tablets by mouth daily      zinc gluconate 50 MG tablet Take 1 tablet by mouth daily      Omega-3 Fatty Acids (FISH OIL) 1200 MG CAPS Take 1,200 mg by mouth daily      atorvastatin (LIPITOR) 40 MG tablet Take 1 tablet by mouth nightly 30 tablet 5    aspirin (EQ ASPIRIN ADULT LOW DOSE) 81 MG EC tablet Take 1 tablet by mouth daily 30 tablet 5    FLUoxetine (PROZAC) 20 MG tablet Take 1 tablet by mouth daily 30 tablet 3    ibuprofen (ADVIL;MOTRIN) 600 MG tablet Take 1 tablet by mouth every 6 hours as needed for Pain (Patient taking differently: Take 1 tablet by mouth as needed for Pain) 120 tablet 1    fluticasone (FLONASE) 50 MCG/ACT nasal spray 1 spray by Nasal route daily 1 Bottle 0    albuterol sulfate HFA (PROAIR HFA) 108 (90 Base) MCG/ACT inhaler Inhale 2 puffs into the lungs every 6 hours as

## 2024-12-04 NOTE — PROGRESS NOTES
BREAST WORKSHEET     Weight: Weight - Scale: 96.6 kg (213 lb)   Height: Height: 149.9 cm (4' 11\")    BMI: Body mass index is 43.02 kg/m².     Marital Status:        [ ] S       [X ] M       [ ] D        [ ] W  How many pregnancies: 2  Children: 3  Planning to breast feed in the future? No     PATIENT HISTORY  History of Breast Cancer:      [ ] Yes       [ X] No      [ ] Right    [ ] Left     [ ] Bilateral  Last Mammogram: 10/29/2024       Where: [facility]  Family History of Breast Cancer?    [ X] Yes        [ ] No     Breast Masses: NA      [ ] Right     [ ] Left     Year:  Breast Biopsies: NA                 [ ] Right     [ ] Left     Year:  Previous Breast Surgeries: NA     [ ] Yes       [ ] No       Year:             Type:   Mastectomy:  NA      [ ] Right     [ ] Left     Year:  Lumpectomy:  NA      [ ] Right     [ ] Left     Year:  Radiation Treatment:  NA     [ ] Yes       [ ] No       Year:               Where?        Doctor?   Miscellaneous:       [ ] Fibrocystic changes            [ ] Systemic Disease   [ ] Mastalgia                              [ ] Diabetes  Bleeding Problems:        [ ] Yes       [ X] No        Type:        Allergies:   Allergies as of 12/04/2024 - Fully Reviewed 12/04/2024   Allergen Reaction Noted    Penicillins Hives 12/30/2011     Other:     PHYSICAL EXAM  Chest Wall        [ ] Pectus Deform      [ ] Scoliosis      [ ] Scars      [ ] Venous Stasis  Asymmetries:         [ ] Right      [ ] Left      [ ] Bilateral  Re-Construction       [ ] Right      [ ] Left      [ ] Bilateral  Tubular:Discussed Options:       [ ] Right      [ ] Left      [ ] Bilateral  Striae:          [ ] Right      [ ] Left      [ ] Bilateral  Other:     NEUROLOGICAL EXAM  Normal:        [ ] Yes       [ ] No           Problem/Explain:   MEASUREMENTS     Chest Circumference in Inches   Above the breast: Chest Circumference in Inches   Bellow the breast:   Breast Girth (inches)

## 2025-05-19 ENCOUNTER — OFFICE VISIT (OUTPATIENT)
Dept: SURGERY | Age: 55
End: 2025-05-19
Payer: COMMERCIAL

## 2025-05-19 VITALS
DIASTOLIC BLOOD PRESSURE: 72 MMHG | HEART RATE: 79 BPM | WEIGHT: 212 LBS | SYSTOLIC BLOOD PRESSURE: 135 MMHG | HEIGHT: 59 IN | BODY MASS INDEX: 42.74 KG/M2

## 2025-05-19 DIAGNOSIS — Z80.3 FAMILY HISTORY OF BREAST CANCER: ICD-10-CM

## 2025-05-19 DIAGNOSIS — Z15.89 BRIP1 GENE MUTATION POSITIVE: Primary | ICD-10-CM

## 2025-05-19 PROCEDURE — 99213 OFFICE O/P EST LOW 20 MIN: CPT | Performed by: SURGERY

## 2025-05-19 PROCEDURE — 3078F DIAST BP <80 MM HG: CPT | Performed by: SURGERY

## 2025-05-19 PROCEDURE — 3075F SYST BP GE 130 - 139MM HG: CPT | Performed by: SURGERY

## 2025-05-19 ASSESSMENT — ENCOUNTER SYMPTOMS
BLOOD IN STOOL: 0
RECTAL PAIN: 0
COUGH: 0
APNEA: 0
ABDOMINAL PAIN: 0
CONSTIPATION: 0
COLOR CHANGE: 0
VOMITING: 0
NAUSEA: 0
WHEEZING: 0
STRIDOR: 0
BACK PAIN: 0
CHEST TIGHTNESS: 0
ANAL BLEEDING: 0
SHORTNESS OF BREATH: 0
DIARRHEA: 0
ABDOMINAL DISTENTION: 0

## 2025-05-19 NOTE — PROGRESS NOTES
Patient's Name/Date of Birth: Heidy Farnsworth / 1970 (54 y.o.)    Date: May 19, 2025    HPI: Pt is a 54 y.o. female who presents to [facility] for a follow-up visit for BRIP1 mutation.    In the interim since her last visit on 11/06/2024, she has had the following imaging: no new imaging.    She has been reviewing her options for management of her wrist and at this time she does not want to have surgery.  She will continue with increased surveillance.  She is going to cancel her appointment with Dr. Rhoades.        Physical Exam:  Vitals:    05/19/25 1005   BP: 135/72   Pulse: 79     General:A & O x3  HEENT:  NCAT  BREAST: The bilateral breasts are symmetric with no skin changes, nipple deformity, discharge, or masses.  There is no tenderness to palpation.  Lymph Nodes:  There is no lymphadenopathy in the supraclavicular, infraclavicular, or axillary areas bilaterally.  Extremity: Normal, without deformities, edema, or skin discoloration  SKIN: Skin color, texture, turgor normal. No rashes or lesions.             Assessment/Plan:  1. BRIP1 gene mutation positive    2. Family history of breast cancer          Heidy Farnsworth is a 54 y.o. female that is seen today for a follow-up visit for BRIP1 mutation.    In the interim since her last visit on 11/06/2024, she has had the following imaging: no new imaging.    She has been reviewing her options for management of her increased risk and at this time she does not want to have surgery.  She will continue with increased surveillance.  She is going to cancel her appointment with Dr. Rhoades.    At this time she does not have any evidence of any disease.  Her repeat imaging has been placed in the form of screening mammogram and MRI.  She will return to clinic in 6 months with those results and for an exam.    Return in about 6 months (around 11/19/2025).     Gail Palma MD  5/21/2025 12:11 AM           Past Medical History:   Diagnosis Date

## 2025-06-24 ENCOUNTER — TELEPHONE (OUTPATIENT)
Dept: GYNECOLOGIC ONCOLOGY | Age: 55
End: 2025-06-24